# Patient Record
Sex: FEMALE | Employment: UNEMPLOYED | URBAN - METROPOLITAN AREA
[De-identification: names, ages, dates, MRNs, and addresses within clinical notes are randomized per-mention and may not be internally consistent; named-entity substitution may affect disease eponyms.]

---

## 2018-08-10 NOTE — TELEPHONE ENCOUNTER
FUTURE VISIT INFORMATION      FUTURE VISIT INFORMATION:    Date: 8/13    Time: 1:00    Location:   REFERRAL INFORMATION:    Referring provider:  Tre Moore    Referring providers clinic:  [facility]    Reason for visit/diagnosis  Left Leg Weakness    RECORDS REQUESTED FROM:       Clinic name Comments Records Status Imaging Status    [facility] RECORDS CARE EVERYWHERE                                     RECORDS STATUS

## 2018-08-10 NOTE — TELEPHONE ENCOUNTER
FUTURE VISIT INFORMATION      FUTURE VISIT INFORMATION:    Date: 8/13    Time: 1:00    Location:   REFERRAL INFORMATION:    Referring provider:  Tre Moore    Referring providers clinic:  [facility]    Reason for visit/diagnosis Lt leg weakness, femoral neuropathy    RECORDS REQUESTED FROM:       Clinic name Comments Records Status Imaging Status                                         RECORDS STATUS

## 2018-08-13 ENCOUNTER — PRE VISIT (OUTPATIENT)
Dept: ORTHOPEDICS | Facility: CLINIC | Age: 38
End: 2018-08-13

## 2018-08-13 DIAGNOSIS — M25.561 PAIN IN BOTH KNEES, UNSPECIFIED CHRONICITY: Primary | ICD-10-CM

## 2018-08-13 DIAGNOSIS — M25.562 PAIN IN BOTH KNEES, UNSPECIFIED CHRONICITY: Primary | ICD-10-CM

## 2018-08-17 ENCOUNTER — RADIANT APPOINTMENT (OUTPATIENT)
Dept: MAMMOGRAPHY | Facility: CLINIC | Age: 38
End: 2018-08-17
Attending: FAMILY MEDICINE
Payer: COMMERCIAL

## 2018-08-17 DIAGNOSIS — Z12.31 VISIT FOR SCREENING MAMMOGRAM: ICD-10-CM

## 2019-04-29 ENCOUNTER — HOSPITAL ENCOUNTER (OUTPATIENT)
Dept: GENERAL RADIOLOGY | Facility: CLINIC | Age: 39
Discharge: HOME | End: 2019-04-29
Attending: FAMILY MEDICINE | Admitting: FAMILY MEDICINE
Payer: COMMERCIAL

## 2019-04-29 DIAGNOSIS — M25.561 RIGHT KNEE PAIN: ICD-10-CM

## 2019-04-29 PROCEDURE — 73560 X-RAY EXAM OF KNEE 1 OR 2: CPT | Mod: RT

## 2020-03-17 NOTE — TELEPHONE ENCOUNTER
DIAGNOSIS: Right knee pain, 4/29/19 R knee [place] [facility] [place] [facility] [place] all recs Care Everywhere   APPOINTMENT DATE: 4/16   NOTES STATUS DETAILS   OFFICE NOTE from referring provider N/A    OFFICE NOTE from other specialist Care Everywhere  internal [facility]    DISCHARGE SUMMARY from hospital N/A    DISCHARGE REPORT from the ER Care Everywhere [facility]-2/16/2011   OPERATIVE REPORT Care Everywhere [facility] 2/17/11   MEDICATION LIST Internal    MRI     CT SCAN     XRAYS (IMAGES & REPORTS) Internal 4/29/19-internal  [facility] [place]:  3/16/12  2/16/11  5/25/11  2/27/11  3/30/11  2/17/11         Sent fax request to [facility] for imaging 3/17

## 2020-04-07 ENCOUNTER — TELEPHONE (OUTPATIENT)
Dept: ORTHOPEDICS | Facility: CLINIC | Age: 40
End: 2020-04-07

## 2020-04-07 NOTE — TELEPHONE ENCOUNTER
It was explained via a Turk[place]  that our clinic is currently restricting patients from coming in for appointments in response to COVID-19. I offered her appointment to be a video visit which the patient accepted. She would like to do this over her smart phone. She was instructed to download the [employer] touchpoint application. It was further explained that the link for the meeting with the doctor will be texted to her. The phone number was verified. She is aware someone will reach out prior to her appointment to check her in. She also had to be rescheduled with a knee specialist, Dr. Spivey for this appointment on 4/15 at 11:30 am.

## 2020-04-15 ENCOUNTER — APPOINTMENT (OUTPATIENT)
Dept: INTERPRETER SERVICES | Facility: CLINIC | Age: 40
End: 2020-04-15
Payer: COMMERCIAL

## 2020-04-16 ENCOUNTER — PRE VISIT (OUTPATIENT)
Dept: ORTHOPEDICS | Facility: CLINIC | Age: 40
End: 2020-04-16

## 2020-05-07 ENCOUNTER — TELEPHONE (OUTPATIENT)
Dept: ORTHOPEDICS | Facility: CLINIC | Age: 40
End: 2020-05-07

## 2020-05-07 NOTE — TELEPHONE ENCOUNTER
Called and LVM via  services that we are needing to convert her appointment to either a virtual video or telephone appointment. Our callback number was left.

## 2020-05-08 ENCOUNTER — APPOINTMENT (OUTPATIENT)
Dept: INTERPRETER SERVICES | Facility: CLINIC | Age: 40
End: 2020-05-08
Payer: COMMERCIAL

## 2020-05-08 NOTE — TELEPHONE ENCOUNTER
Called and LVM again through  services to call us back to convert to video or telephone visit. Our number was left.